# Patient Record
(demographics unavailable — no encounter records)

---

## 2024-10-31 NOTE — DATA REVIEWED
[de-identified] : XR left shoulder 2 views 8/15/24: no acute fracture or dislocation. Skeletally immature
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
## 2024-10-31 NOTE — DATA REVIEWED
[de-identified] : XR left shoulder 2 views 8/15/24: no acute fracture or dislocation. Skeletally immature
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
## 2024-10-31 NOTE — END OF VISIT
[FreeTextEntry3] :     Saw and examined patient; the above is an accurate documentation of my words and actions.   Darlene Snyder MD [facility] Pediatric Orthopedic Surgery

## 2024-10-31 NOTE — REVIEW OF SYSTEMS
[Change in Activity] : change in activity [Nl] : Musculoskeletal [No Acute Changes] : No acute changes since previous visit [Joint Pains] : no arthralgias

## 2024-10-31 NOTE — END OF VISIT
[FreeTextEntry3] :     Saw and examined patient; the above is an accurate documentation of my words and actions.   Darlene Snyder MD [facility] Pediatric Orthopedic Surgery

## 2024-10-31 NOTE — PHYSICAL EXAM
[FreeTextEntry1] : Gait: Presents ambulating independently without signs of antalgia.  Good coordination and balance noted. GENERAL: alert, cooperative, in NAD SKIN: The skin is intact, warm, pink and dry over the area examined. EYES: Normal conjunctiva, normal eyelids and pupils were equal and round. ENT: normal ears, normal nose and normal lips. CARDIOVASCULAR: brisk capillary refill, but no peripheral edema. RESPIRATORY: The patient is in no apparent respiratory distress. They're taking full deep breaths without use of accessory muscles or evidence of audible wheezes or stridor without the use of a stethoscope. Normal respiratory effort. ABDOMEN: not examined  focused exam left shoulder full range of motion of the shoulder. Subjective paresthesia about the deltoid area which has nearly resolved.  There is no deformity noted on observation when compared to the contralateral shoulder. 2+ pulses palpated in the upper extremity, with capillary refill +1 in all 5 fingers. There is no discomfort elicited with palpation over the clavicle, a.c. joint or glenoid. No discomfort with palpation over the humeral head. There is no current cervical spine pain.